# Patient Record
Sex: MALE | Race: WHITE | NOT HISPANIC OR LATINO | Employment: FULL TIME | URBAN - METROPOLITAN AREA
[De-identification: names, ages, dates, MRNs, and addresses within clinical notes are randomized per-mention and may not be internally consistent; named-entity substitution may affect disease eponyms.]

---

## 2022-09-13 ENCOUNTER — HOSPITAL ENCOUNTER (EMERGENCY)
Facility: HOSPITAL | Age: 37
Discharge: PSYCHIATRIC OR SUBSTANCE USE FACILITY | End: 2022-09-14
Attending: SURGERY
Payer: COMMERCIAL

## 2022-09-13 DIAGNOSIS — F29 PSYCHOSIS, UNSPECIFIED PSYCHOSIS TYPE: Primary | ICD-10-CM

## 2022-09-13 LAB
ALBUMIN SERPL BCP-MCNC: 4.7 G/DL (ref 3.5–5.2)
ALP SERPL-CCNC: 92 U/L (ref 55–135)
ALT SERPL W/O P-5'-P-CCNC: 22 U/L (ref 10–44)
AMPHET+METHAMPHET UR QL: NEGATIVE
ANION GAP SERPL CALC-SCNC: 10 MMOL/L (ref 8–16)
APAP SERPL-MCNC: <3 UG/ML (ref 10–20)
AST SERPL-CCNC: 17 U/L (ref 10–40)
BARBITURATES UR QL SCN>200 NG/ML: NEGATIVE
BASOPHILS # BLD AUTO: 0.03 K/UL (ref 0–0.2)
BASOPHILS NFR BLD: 0.4 % (ref 0–1.9)
BENZODIAZ UR QL SCN>200 NG/ML: NEGATIVE
BILIRUB SERPL-MCNC: 2.3 MG/DL (ref 0.1–1)
BILIRUB UR QL STRIP: ABNORMAL
BUN SERPL-MCNC: 15 MG/DL (ref 6–20)
BZE UR QL SCN: NEGATIVE
CALCIUM SERPL-MCNC: 9.6 MG/DL (ref 8.7–10.5)
CANNABINOIDS UR QL SCN: NEGATIVE
CHLORIDE SERPL-SCNC: 105 MMOL/L (ref 95–110)
CLARITY UR: CLEAR
CO2 SERPL-SCNC: 25 MMOL/L (ref 23–29)
COLOR UR: ABNORMAL
CREAT SERPL-MCNC: 1 MG/DL (ref 0.5–1.4)
CREAT UR-MCNC: 382.6 MG/DL (ref 23–375)
DIFFERENTIAL METHOD: ABNORMAL
EOSINOPHIL # BLD AUTO: 0.1 K/UL (ref 0–0.5)
EOSINOPHIL NFR BLD: 1.2 % (ref 0–8)
ERYTHROCYTE [DISTWIDTH] IN BLOOD BY AUTOMATED COUNT: 12.8 % (ref 11.5–14.5)
EST. GFR  (NO RACE VARIABLE): >60 ML/MIN/1.73 M^2
ETHANOL SERPL-MCNC: <10 MG/DL
GLUCOSE SERPL-MCNC: 106 MG/DL (ref 70–110)
GLUCOSE UR QL STRIP: NEGATIVE
HCT VFR BLD AUTO: 50.6 % (ref 40–54)
HGB BLD-MCNC: 17.7 G/DL (ref 14–18)
HGB UR QL STRIP: NEGATIVE
IMM GRANULOCYTES # BLD AUTO: 0.01 K/UL (ref 0–0.04)
IMM GRANULOCYTES NFR BLD AUTO: 0.1 % (ref 0–0.5)
KETONES UR QL STRIP: NEGATIVE
LEUKOCYTE ESTERASE UR QL STRIP: NEGATIVE
LYMPHOCYTES # BLD AUTO: 1.8 K/UL (ref 1–4.8)
LYMPHOCYTES NFR BLD: 23.6 % (ref 18–48)
MCH RBC QN AUTO: 33.4 PG (ref 27–31)
MCHC RBC AUTO-ENTMCNC: 35 G/DL (ref 32–36)
MCV RBC AUTO: 96 FL (ref 82–98)
METHADONE UR QL SCN>300 NG/ML: NEGATIVE
MONOCYTES # BLD AUTO: 0.7 K/UL (ref 0.3–1)
MONOCYTES NFR BLD: 8.5 % (ref 4–15)
NEUTROPHILS # BLD AUTO: 5 K/UL (ref 1.8–7.7)
NEUTROPHILS NFR BLD: 66.2 % (ref 38–73)
NITRITE UR QL STRIP: NEGATIVE
NRBC BLD-RTO: 0 /100 WBC
OPIATES UR QL SCN: NEGATIVE
PCP UR QL SCN>25 NG/ML: NEGATIVE
PH UR STRIP: 6 [PH] (ref 5–8)
PLATELET # BLD AUTO: 197 K/UL (ref 150–450)
PMV BLD AUTO: 9.8 FL (ref 9.2–12.9)
POTASSIUM SERPL-SCNC: 3.8 MMOL/L (ref 3.5–5.1)
PROT SERPL-MCNC: 8 G/DL (ref 6–8.4)
PROT UR QL STRIP: ABNORMAL
RBC # BLD AUTO: 5.3 M/UL (ref 4.6–6.2)
SALICYLATES SERPL-MCNC: <5 MG/DL (ref 15–30)
SARS-COV-2 RDRP RESP QL NAA+PROBE: NEGATIVE
SODIUM SERPL-SCNC: 140 MMOL/L (ref 136–145)
SP GR UR STRIP: >=1.03 (ref 1–1.03)
T4 FREE SERPL-MCNC: 1.06 NG/DL (ref 0.71–1.51)
TOXICOLOGY INFORMATION: ABNORMAL
TSH SERPL DL<=0.005 MIU/L-ACNC: 2.44 UIU/ML (ref 0.4–4)
URN SPEC COLLECT METH UR: ABNORMAL
UROBILINOGEN UR STRIP-ACNC: ABNORMAL EU/DL
WBC # BLD AUTO: 7.62 K/UL (ref 3.9–12.7)

## 2022-09-13 PROCEDURE — G0425 INPT/ED TELECONSULT30: HCPCS | Mod: GT,,, | Performed by: PSYCHIATRY & NEUROLOGY

## 2022-09-13 PROCEDURE — 99285 EMERGENCY DEPT VISIT HI MDM: CPT

## 2022-09-13 PROCEDURE — 82306 VITAMIN D 25 HYDROXY: CPT | Performed by: SURGERY

## 2022-09-13 PROCEDURE — 82077 ASSAY SPEC XCP UR&BREATH IA: CPT | Performed by: SURGERY

## 2022-09-13 PROCEDURE — 80143 DRUG ASSAY ACETAMINOPHEN: CPT | Performed by: SURGERY

## 2022-09-13 PROCEDURE — G0425 PR INPT TELEHEALTH CONSULT 30M: ICD-10-PCS | Mod: GT,,, | Performed by: PSYCHIATRY & NEUROLOGY

## 2022-09-13 PROCEDURE — 80307 DRUG TEST PRSMV CHEM ANLYZR: CPT | Performed by: SURGERY

## 2022-09-13 PROCEDURE — 36415 COLL VENOUS BLD VENIPUNCTURE: CPT | Performed by: SURGERY

## 2022-09-13 PROCEDURE — 81003 URINALYSIS AUTO W/O SCOPE: CPT | Mod: 59 | Performed by: SURGERY

## 2022-09-13 PROCEDURE — 80061 LIPID PANEL: CPT | Performed by: STUDENT IN AN ORGANIZED HEALTH CARE EDUCATION/TRAINING PROGRAM

## 2022-09-13 PROCEDURE — 80179 DRUG ASSAY SALICYLATE: CPT | Performed by: SURGERY

## 2022-09-13 PROCEDURE — U0002 COVID-19 LAB TEST NON-CDC: HCPCS | Performed by: SURGERY

## 2022-09-13 PROCEDURE — 84439 ASSAY OF FREE THYROXINE: CPT | Performed by: SURGERY

## 2022-09-13 PROCEDURE — 80053 COMPREHEN METABOLIC PANEL: CPT | Performed by: SURGERY

## 2022-09-13 PROCEDURE — 84443 ASSAY THYROID STIM HORMONE: CPT | Performed by: SURGERY

## 2022-09-13 PROCEDURE — 82607 VITAMIN B-12: CPT | Performed by: SURGERY

## 2022-09-13 PROCEDURE — 85025 COMPLETE CBC W/AUTO DIFF WBC: CPT | Performed by: SURGERY

## 2022-09-13 PROCEDURE — 82746 ASSAY OF FOLIC ACID SERUM: CPT | Performed by: SURGERY

## 2022-09-14 ENCOUNTER — HOSPITAL ENCOUNTER (INPATIENT)
Facility: HOSPITAL | Age: 37
LOS: 1 days | Discharge: HOME | DRG: 885 | End: 2022-09-15
Attending: STUDENT IN AN ORGANIZED HEALTH CARE EDUCATION/TRAINING PROGRAM | Admitting: STUDENT IN AN ORGANIZED HEALTH CARE EDUCATION/TRAINING PROGRAM
Payer: COMMERCIAL

## 2022-09-14 VITALS
HEART RATE: 65 BPM | DIASTOLIC BLOOD PRESSURE: 55 MMHG | WEIGHT: 157 LBS | RESPIRATION RATE: 16 BRPM | BODY MASS INDEX: 23.87 KG/M2 | OXYGEN SATURATION: 95 % | SYSTOLIC BLOOD PRESSURE: 98 MMHG | TEMPERATURE: 97 F

## 2022-09-14 DIAGNOSIS — F39 MOOD DISORDER: ICD-10-CM

## 2022-09-14 DIAGNOSIS — F29 PSYCHOSIS, UNSPECIFIED PSYCHOSIS TYPE: Primary | ICD-10-CM

## 2022-09-14 LAB
25(OH)D3+25(OH)D2 SERPL-MCNC: 34 NG/ML (ref 30–96)
CHOLEST SERPL-MCNC: 199 MG/DL (ref 120–199)
CHOLEST/HDLC SERPL: 5.4 {RATIO} (ref 2–5)
FOLATE SERPL-MCNC: 5 NG/ML (ref 4–24)
HDLC SERPL-MCNC: 37 MG/DL (ref 40–75)
HDLC SERPL: 18.6 % (ref 20–50)
LDLC SERPL CALC-MCNC: 134.2 MG/DL (ref 63–159)
NONHDLC SERPL-MCNC: 162 MG/DL
TRIGL SERPL-MCNC: 139 MG/DL (ref 30–150)
VIT B12 SERPL-MCNC: 313 PG/ML (ref 210–950)

## 2022-09-14 PROCEDURE — 11400000 HC PSYCH PRIVATE ROOM

## 2022-09-14 PROCEDURE — 99222 1ST HOSP IP/OBS MODERATE 55: CPT | Mod: ,,, | Performed by: NURSE PRACTITIONER

## 2022-09-14 PROCEDURE — 99223 1ST HOSP IP/OBS HIGH 75: CPT | Mod: ,,, | Performed by: STUDENT IN AN ORGANIZED HEALTH CARE EDUCATION/TRAINING PROGRAM

## 2022-09-14 PROCEDURE — 36415 COLL VENOUS BLD VENIPUNCTURE: CPT | Performed by: STUDENT IN AN ORGANIZED HEALTH CARE EDUCATION/TRAINING PROGRAM

## 2022-09-14 PROCEDURE — 99223 PR INITIAL HOSPITAL CARE,LEVL III: ICD-10-PCS | Mod: ,,, | Performed by: STUDENT IN AN ORGANIZED HEALTH CARE EDUCATION/TRAINING PROGRAM

## 2022-09-14 PROCEDURE — 99222 PR INITIAL HOSPITAL CARE,LEVL II: ICD-10-PCS | Mod: ,,, | Performed by: NURSE PRACTITIONER

## 2022-09-14 RX ORDER — FOLIC ACID 1 MG/1
1 TABLET ORAL DAILY
Status: DISCONTINUED | OUTPATIENT
Start: 2022-09-14 | End: 2022-09-15 | Stop reason: HOSPADM

## 2022-09-14 RX ORDER — OLANZAPINE 10 MG/1
10 TABLET ORAL EVERY 4 HOURS PRN
Status: DISCONTINUED | OUTPATIENT
Start: 2022-09-14 | End: 2022-09-15 | Stop reason: HOSPADM

## 2022-09-14 RX ORDER — IBUPROFEN 200 MG
1 TABLET ORAL DAILY PRN
Status: DISCONTINUED | OUTPATIENT
Start: 2022-09-14 | End: 2022-09-15 | Stop reason: HOSPADM

## 2022-09-14 RX ORDER — OLANZAPINE 10 MG/2ML
10 INJECTION, POWDER, FOR SOLUTION INTRAMUSCULAR EVERY 4 HOURS PRN
Status: DISCONTINUED | OUTPATIENT
Start: 2022-09-14 | End: 2022-09-15 | Stop reason: HOSPADM

## 2022-09-14 RX ORDER — HYDROXYZINE PAMOATE 50 MG/1
50 CAPSULE ORAL EVERY 6 HOURS PRN
Status: DISCONTINUED | OUTPATIENT
Start: 2022-09-14 | End: 2022-09-15 | Stop reason: HOSPADM

## 2022-09-14 RX ORDER — IBUPROFEN 200 MG
1 TABLET ORAL DAILY
Status: DISCONTINUED | OUTPATIENT
Start: 2022-09-14 | End: 2022-09-14

## 2022-09-14 NOTE — SUBJECTIVE & OBJECTIVE
Past Medical History:   Diagnosis Date    ADD (attention deficit disorder)     Special screening for malignant neoplasm of colon 09/06/2016       Past Surgical History:   Procedure Laterality Date    KNEE SURGERY Right 1995    Removal of a foreign object-stick       Review of patient's allergies indicates:  No Known Allergies    No current facility-administered medications on file prior to encounter.     Current Outpatient Medications on File Prior to Encounter   Medication Sig    dextroamphetamine-amphetamine (ADDERALL) 30 mg Tab Take 1 tablet by mouth 2 (two) times daily.     Family History    None       Tobacco Use    Smoking status: Some Days     Packs/day: 0.10     Types: Cigarettes     Start date: 9/1/2003    Smokeless tobacco: Never    Tobacco comments:     in process of trying to stop-down to 1 and 1/2 cigarettes a day   Substance and Sexual Activity    Alcohol use: No    Drug use: No    Sexual activity: Yes     Partners: Female     Comment:      Review of Systems   Constitutional:  Negative for chills and fever.   HENT:  Negative for congestion, postnasal drip and sore throat.    Eyes:  Negative for photophobia.   Respiratory:  Negative for chest tightness and shortness of breath.    Cardiovascular:  Negative for chest pain.   Gastrointestinal:  Negative for abdominal distention, abdominal pain, blood in stool and vomiting.   Genitourinary:  Negative for dysuria, flank pain and hematuria.   Musculoskeletal:  Negative for back pain.   Skin:  Negative for pallor.   Neurological:  Negative for dizziness, seizures, facial asymmetry, speech difficulty and numbness.   Hematological:  Does not bruise/bleed easily.   Psychiatric/Behavioral:  Positive for dysphoric mood and hallucinations. Negative for agitation and suicidal ideas. The patient is not nervous/anxious.    Objective:     Vital Signs (Most Recent):  Temp: 97.6 °F (36.4 °C) (09/14/22 0732)  Pulse: (!) 57 (09/14/22 0732)  Resp: 18 (09/14/22  0732)  BP: (!) 102/58 (09/14/22 0732)  SpO2: 97 % (09/14/22 0130)   Vital Signs (24h Range):  Temp:  [97 °F (36.1 °C)-97.6 °F (36.4 °C)] 97.6 °F (36.4 °C)  Pulse:  [57-90] 57  Resp:  [16-18] 18  SpO2:  [95 %-100 %] 97 %  BP: ()/(55-91) 102/58     Weight: 67 kg (147 lb 9.6 oz)  Body mass index is 21.8 kg/m².    Physical Exam  Vitals and nursing note reviewed.   Constitutional:       Appearance: Normal appearance. He is well-developed.   HENT:      Head: Normocephalic and atraumatic.   Neck:      Thyroid: No thyromegaly.   Cardiovascular:      Rate and Rhythm: Normal rate and regular rhythm.      Heart sounds: Normal heart sounds. No murmur heard.  Pulmonary:      Effort: Pulmonary effort is normal. No respiratory distress.      Breath sounds: Normal breath sounds. No wheezing or rales.   Abdominal:      General: Bowel sounds are normal. There is no distension.      Palpations: Abdomen is soft.      Tenderness: There is no abdominal tenderness.   Musculoskeletal:         General: No deformity. Normal range of motion.      Cervical back: Normal range of motion and neck supple.   Skin:     General: Skin is warm and dry.   Neurological:      Mental Status: He is alert and oriented to person, place, and time.      Comments: Neuro: Cranial nerves:  CN II Visual fields full to confrontation.   CN III, IV, VI Pupils are equal, round, and reactive to light.  CN III: no palsy  Nystagmus: none   Diplopia: none  Ophthalmoparesis: none  CN V Facial sensation intact.   CN VII Facial expression full, symmetric.   CN VIII normal.   CN IX normal.   CN X normal.   CN XI normal.   CN XII normal.         Significant Labs:     U/A  Trace protein 1+ bili and 4-6 urobilinogen   UDS  Results for orders placed or performed during the hospital encounter of 09/13/22   Drug screen panel, in-house   Result Value Ref Range    Benzodiazepines Negative Negative    Methadone metabolites Negative Negative    Cocaine (Metab.) Negative Negative     Opiate Scrn, Ur Negative Negative    Barbiturate Screen, Ur Negative Negative    Amphetamine Screen, Ur Negative Negative    THC Negative Negative    Phencyclidine Negative Negative    Creatinine, Urine 382.6 (H) 23.0 - 375.0 mg/dL    Toxicology Information SEE COMMENT      CBC  Results for orders placed or performed during the hospital encounter of 09/13/22   CBC Auto Differential   Result Value Ref Range    WBC 7.62 3.90 - 12.70 K/uL    RBC 5.30 4.60 - 6.20 M/uL    Hemoglobin 17.7 14.0 - 18.0 g/dL    Hematocrit 50.6 40.0 - 54.0 %    MCV 96 82 - 98 fL    MCH 33.4 (H) 27.0 - 31.0 pg    MCHC 35.0 32.0 - 36.0 g/dL    RDW 12.8 11.5 - 14.5 %    Platelets 197 150 - 450 K/uL    MPV 9.8 9.2 - 12.9 fL    Immature Granulocytes 0.1 0.0 - 0.5 %    Gran # (ANC) 5.0 1.8 - 7.7 K/uL    Immature Grans (Abs) 0.01 0.00 - 0.04 K/uL    Lymph # 1.8 1.0 - 4.8 K/uL    Mono # 0.7 0.3 - 1.0 K/uL    Eos # 0.1 0.0 - 0.5 K/uL    Baso # 0.03 0.00 - 0.20 K/uL    nRBC 0 0 /100 WBC    Gran % 66.2 38.0 - 73.0 %    Lymph % 23.6 18.0 - 48.0 %    Mono % 8.5 4.0 - 15.0 %    Eosinophil % 1.2 0.0 - 8.0 %    Basophil % 0.4 0.0 - 1.9 %    Differential Method Automated      CMP  Results for orders placed or performed during the hospital encounter of 09/13/22   Comprehensive Metabolic Panel   Result Value Ref Range    Sodium 140 136 - 145 mmol/L    Potassium 3.8 3.5 - 5.1 mmol/L    Chloride 105 95 - 110 mmol/L    CO2 25 23 - 29 mmol/L    Glucose 106 70 - 110 mg/dL    BUN 15 6 - 20 mg/dL    Creatinine 1.0 0.5 - 1.4 mg/dL    Calcium 9.6 8.7 - 10.5 mg/dL    Total Protein 8.0 6.0 - 8.4 g/dL    Albumin 4.7 3.5 - 5.2 g/dL    Total Bilirubin 2.3 (H) 0.1 - 1.0 mg/dL    Alkaline Phosphatase 92 55 - 135 U/L    AST 17 10 - 40 U/L    ALT 22 10 - 44 U/L    Anion Gap 10 8 - 16 mmol/L    eGFR >60 >60 mL/min/1.73 m^2     TSH  Results for orders placed or performed during the hospital encounter of 09/13/22   TSH   Result Value Ref Range    TSH 2.441 0.400 - 4.000 uIU/mL      ETOH  Results for orders placed or performed during the hospital encounter of 09/13/22   Ethanol   Result Value Ref Range    Alcohol, Serum <10 <10 mg/dL     Salicylate  Results for orders placed or performed during the hospital encounter of 09/13/22   Salicylate Level   Result Value Ref Range    Salicylate Lvl <5.0 (L) 15.0 - 30.0 mg/dL     Acetaminophen  Results for orders placed or performed during the hospital encounter of 09/13/22   Acetaminophen Level   Result Value Ref Range    Acetaminophen (Tylenol), Serum <3.0 (L) 10.0 - 20.0 ug/mL

## 2022-09-14 NOTE — CONSULTS
[facility] Hospital Medicine  Consult Note    Patient Name: Hal Calderon  MRN: [id number]  Admission Date: 9/14/2022  Hospital Length of Stay: 0 days  Attending Physician: Jonathon Huffman III, MD   Primary Care Provider: Nirav Moeller NP           Patient information was obtained from patient and ER records.     Inpatient consult to Family Medicine  Consult performed by: Marlin Gonzalez NP  Consult ordered by: Jonathon Huffman III, MD        Subjective:     Principal Problem: <principal problem not specified>    Chief Complaint: No chief complaint on file.       HPI: 36 yo male patient admitted to [facility] from ER for depression. He was placed in U and medicine was consulted for H>p. VSS/afebrile. Labs reviewed       Past Medical History:   Diagnosis Date    ADD (attention deficit disorder)     Special screening for malignant neoplasm of colon 09/06/2016       Past Surgical History:   Procedure Laterality Date    KNEE SURGERY Right 1995    Removal of a foreign object-stick       Review of patient's allergies indicates:  No Known Allergies    No current facility-administered medications on file prior to encounter.     Current Outpatient Medications on File Prior to Encounter   Medication Sig    dextroamphetamine-amphetamine (ADDERALL) 30 mg Tab Take 1 tablet by mouth 2 (two) times daily.     Family History    None       Tobacco Use    Smoking status: Some Days     Packs/day: 0.10     Types: Cigarettes     Start date: 9/1/2003    Smokeless tobacco: Never    Tobacco comments:     in process of trying to stop-down to 1 and 1/2 cigarettes a day   Substance and Sexual Activity    Alcohol use: No    Drug use: No    Sexual activity: Yes     Partners: Female     Comment:      Review of Systems   Constitutional:  Negative for chills and fever.   HENT:  Negative for congestion, postnasal drip and sore throat.    Eyes:  Negative for photophobia.   Respiratory:  Negative for chest tightness  and shortness of breath.    Cardiovascular:  Negative for chest pain.   Gastrointestinal:  Negative for abdominal distention, abdominal pain, blood in stool and vomiting.   Genitourinary:  Negative for dysuria, flank pain and hematuria.   Musculoskeletal:  Negative for back pain.   Skin:  Negative for pallor.   Neurological:  Negative for dizziness, seizures, facial asymmetry, speech difficulty and numbness.   Hematological:  Does not bruise/bleed easily.   Psychiatric/Behavioral:  Positive for dysphoric mood and hallucinations. Negative for agitation and suicidal ideas. The patient is not nervous/anxious.    Objective:     Vital Signs (Most Recent):  Temp: 97.6 °F (36.4 °C) (09/14/22 0732)  Pulse: (!) 57 (09/14/22 0732)  Resp: 18 (09/14/22 0732)  BP: (!) 102/58 (09/14/22 0732)  SpO2: 97 % (09/14/22 0130)   Vital Signs (24h Range):  Temp:  [97 °F (36.1 °C)-97.6 °F (36.4 °C)] 97.6 °F (36.4 °C)  Pulse:  [57-90] 57  Resp:  [16-18] 18  SpO2:  [95 %-100 %] 97 %  BP: ()/(55-91) 102/58     Weight: 67 kg (147 lb 9.6 oz)  Body mass index is 21.8 kg/m².    Physical Exam  Vitals and nursing note reviewed.   Constitutional:       Appearance: Normal appearance. He is well-developed.   HENT:      Head: Normocephalic and atraumatic.   Neck:      Thyroid: No thyromegaly.   Cardiovascular:      Rate and Rhythm: Normal rate and regular rhythm.      Heart sounds: Normal heart sounds. No murmur heard.  Pulmonary:      Effort: Pulmonary effort is normal. No respiratory distress.      Breath sounds: Normal breath sounds. No wheezing or rales.   Abdominal:      General: Bowel sounds are normal. There is no distension.      Palpations: Abdomen is soft.      Tenderness: There is no abdominal tenderness.   Musculoskeletal:         General: No deformity. Normal range of motion.      Cervical back: Normal range of motion and neck supple.   Skin:     General: Skin is warm and dry.   Neurological:      Mental Status: He is alert and  oriented to person, place, and time.      Comments: Neuro: Cranial nerves:  CN II Visual fields full to confrontation.   CN III, IV, VI Pupils are equal, round, and reactive to light.  CN III: no palsy  Nystagmus: none   Diplopia: none  Ophthalmoparesis: none  CN V Facial sensation intact.   CN VII Facial expression full, symmetric.   CN VIII normal.   CN IX normal.   CN X normal.   CN XI normal.   CN XII normal.         Significant Labs:     U/A  Trace protein 1+ bili and 4-6 urobilinogen   UDS  Results for orders placed or performed during the hospital encounter of 09/13/22   Drug screen panel, in-house   Result Value Ref Range    Benzodiazepines Negative Negative    Methadone metabolites Negative Negative    Cocaine (Metab.) Negative Negative    Opiate Scrn, Ur Negative Negative    Barbiturate Screen, Ur Negative Negative    Amphetamine Screen, Ur Negative Negative    THC Negative Negative    Phencyclidine Negative Negative    Creatinine, Urine 382.6 (H) 23.0 - 375.0 mg/dL    Toxicology Information SEE COMMENT      CBC  Results for orders placed or performed during the hospital encounter of 09/13/22   CBC Auto Differential   Result Value Ref Range    WBC 7.62 3.90 - 12.70 K/uL    RBC 5.30 4.60 - 6.20 M/uL    Hemoglobin 17.7 14.0 - 18.0 g/dL    Hematocrit 50.6 40.0 - 54.0 %    MCV 96 82 - 98 fL    MCH 33.4 (H) 27.0 - 31.0 pg    MCHC 35.0 32.0 - 36.0 g/dL    RDW 12.8 11.5 - 14.5 %    Platelets 197 150 - 450 K/uL    MPV 9.8 9.2 - 12.9 fL    Immature Granulocytes 0.1 0.0 - 0.5 %    Gran # (ANC) 5.0 1.8 - 7.7 K/uL    Immature Grans (Abs) 0.01 0.00 - 0.04 K/uL    Lymph # 1.8 1.0 - 4.8 K/uL    Mono # 0.7 0.3 - 1.0 K/uL    Eos # 0.1 0.0 - 0.5 K/uL    Baso # 0.03 0.00 - 0.20 K/uL    nRBC 0 0 /100 WBC    Gran % 66.2 38.0 - 73.0 %    Lymph % 23.6 18.0 - 48.0 %    Mono % 8.5 4.0 - 15.0 %    Eosinophil % 1.2 0.0 - 8.0 %    Basophil % 0.4 0.0 - 1.9 %    Differential Method Automated      CMP  Results for orders placed or  performed during the hospital encounter of 09/13/22   Comprehensive Metabolic Panel   Result Value Ref Range    Sodium 140 136 - 145 mmol/L    Potassium 3.8 3.5 - 5.1 mmol/L    Chloride 105 95 - 110 mmol/L    CO2 25 23 - 29 mmol/L    Glucose 106 70 - 110 mg/dL    BUN 15 6 - 20 mg/dL    Creatinine 1.0 0.5 - 1.4 mg/dL    Calcium 9.6 8.7 - 10.5 mg/dL    Total Protein 8.0 6.0 - 8.4 g/dL    Albumin 4.7 3.5 - 5.2 g/dL    Total Bilirubin 2.3 (H) 0.1 - 1.0 mg/dL    Alkaline Phosphatase 92 55 - 135 U/L    AST 17 10 - 40 U/L    ALT 22 10 - 44 U/L    Anion Gap 10 8 - 16 mmol/L    eGFR >60 >60 mL/min/1.73 m^2     TSH  Results for orders placed or performed during the hospital encounter of 09/13/22   TSH   Result Value Ref Range    TSH 2.441 0.400 - 4.000 uIU/mL     ETOH  Results for orders placed or performed during the hospital encounter of 09/13/22   Ethanol   Result Value Ref Range    Alcohol, Serum <10 <10 mg/dL     Salicylate  Results for orders placed or performed during the hospital encounter of 09/13/22   Salicylate Level   Result Value Ref Range    Salicylate Lvl <5.0 (L) 15.0 - 30.0 mg/dL     Acetaminophen  Results for orders placed or performed during the hospital encounter of 09/13/22   Acetaminophen Level   Result Value Ref Range    Acetaminophen (Tylenol), Serum <3.0 (L) 10.0 - 20.0 ug/mL             Assessment/Plan:     Psychosis  Further orders per psych        VTE Risk Mitigation (From admission, onward)    None              Thank you for your consult. I will sign off. Please contact us if you have any additional questions.    Marlin Gonzalez NP  Department of Hospital Medicine   [facility]

## 2022-09-14 NOTE — CONSULTS
"[facility]  Psychiatry  Telepsychiatry Consult Note    Please see previous notes:    Patient agreeable to consultation via telepsychiatry.    Tele-Consultation from Psychiatry started: 9/13/2022 at 10:50pm  The chief complaint leading to psychiatric consultation is: OPC  This consultation was requested by Dr Garcia, the Emergency Department attending physician.  The location of the consulting psychiatrist is  [place] .  The patient location is  [facility] EMERGENCY DEPARTMENT   The patient arrived at the ED at: [facility]    Also present with the patient at the time of the consultation: nobody    Patient Identification:   Hal Calderon is a 37 y.o. male.    Patient information was obtained from patient and past medical records.  Patient presented involuntarily to the Emergency Department     Consults  Teleconsult Time Documentation  Subjective:     History of Present Illness:  36yo male with hx of ADHD who was brought in on OPC for psychosis.     On interview, patient reports he is convinced his wife is trying to "put me away "because she is cheating on him. "She is trying to frame me and say I am crazy when I am actually not." Patient reports he has never seen this person but knows she is cheating. He reports he has suspected wife of cheating for one year. Reports he did not expect her to file an OPC.  Denied SI and HI.  Denied depression. Denied anxiety  Denied psychotic sx  Reports he sleeps well  Good appetite  Thus far in the ED patient has been calm and cooperative  This is the extent of patients complaints at this time  12 pt ros was negative aside from sx noted above.  I tried to call patients wife 2x at number on [phone] and in chart and she did not answer either number.         Psychiatric History:   Previous Psychiatric Hospitalizations: No   Previous Medication Trials: yes, adderall  Previous Suicide Attempts: no   History of Violence: no  History of Depression: no  History of Cece: " "no  History of Auditory/Visual Hallucination no  History of Delusions: no  Outpatient psychiatrist (current & past): No    Substance Abuse History:  Tobacco:No  Alcohol: No  Illicit Substances:No  Detox/Rehab: No    Legal History: Past charges/incarcerations: No     Family Psychiatric History:   denied      Social History:  Has multiple children 6,8,9, and 11. Denied access to firearms. [place] Patient reports he has family who live in the area.   6th grade education  No  hx    Psychiatric Mental Status Exam:  Arousal: alert  Sensorium/Orientation: oriented to grossly intact  Behavior/Cooperation: normal, cooperative   Speech: normal tone, normal rate, normal pitch, normal volume  Language: grossly intact  Mood: " fine "   Affect: appropriate  Thought Process: normal and logical  Thought Content:   Auditory hallucinations: NO  Visual hallucinations: NO  Paranoia: NO  Delusions:  possible delusion about his wife  Suicidal ideation: NO  Homicidal ideation: NO  Attention/Concentration:  intact  Memory:    Recent:  Intact   Remote: Intact     Fund of Knowledge: Aware of current events   Abstract reasoning: similarities were abstract  Insight: fair  Judgment: engaged with care      Past Medical History:   Past Medical History:   Diagnosis Date    ADD (attention deficit disorder)     Special screening for malignant neoplasm of colon 9/6/2016      Laboratory Data:   Labs Reviewed   COMPREHENSIVE METABOLIC PANEL - Abnormal; Notable for the following components:       Result Value    Total Bilirubin 2.3 (*)     All other components within normal limits   ACETAMINOPHEN LEVEL - Abnormal; Notable for the following components:    Acetaminophen (Tylenol), Serum <3.0 (*)     All other components within normal limits   SALICYLATE LEVEL - Abnormal; Notable for the following components:    Salicylate Lvl <5.0 (*)     All other components within normal limits   URINALYSIS, REFLEX TO URINE CULTURE - Abnormal; Notable " for the following components:    Specific Gravity, UA >=1.030 (*)     Protein, UA Trace (*)     Bilirubin (UA) 1+ (*)     Urobilinogen, UA 4.0-6.0 (*)     All other components within normal limits    Narrative:     Specimen Source->Urine   DRUG SCREEN PANEL, URINE EMERGENCY - Abnormal; Notable for the following components:    Creatinine, Urine 382.6 (*)     All other components within normal limits    Narrative:     Specimen Source->Urine   CBC W/ AUTO DIFFERENTIAL - Abnormal; Notable for the following components:    MCH 33.4 (*)     All other components within normal limits   SARS-COV-2 RNA AMPLIFICATION, QUAL   TSH   ALCOHOL,MEDICAL (ETHANOL)   T4, FREE   VITAMIN B12   FOLATE   VITAMIN D       Allergies:   Review of patient's allergies indicates:  No Known Allergies    Medications in ER: Medications - No data to display    Medications at home: reviewed with patient and in MAR    No new subjective & objective note has been filed under this hospital service since the last note was generated.      Assessment - Diagnosis - Goals:     Diagnosis/Impression:   Patient may have underlying psychotic illness. His description of his wife cheating on him without concrete evidence raises suspicion for delusion.    Rec:   At this time without interviewing collateral, I cannot say whether patient is psychotic or not however there are concerning features to his presentation. I recommend proceeding with OPC and the inpatient psychiatric team can  monitor patient further while getting in touch with collateral. This will allow for further diagnostic clarification and  risk stratification.     Time with patient, coordinating care: 30 min      More than 50% of the time was spent counseling/coordinating care    Consulting clinician was informed of the encounter and consult note.    Consultation ended: 9/13/2022 at 11:35pm    Patrick Kirk MD  Psychiatry  [facility]
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
## 2022-09-14 NOTE — HPI
36 yo male patient admitted to U from ER for depression. He was placed in U and medicine was consulted for H>p. VSS/afebrile. Labs reviewed

## 2022-09-14 NOTE — ED PROVIDER NOTES
Encounter Date: 9/13/2022       History     Chief Complaint   Patient presents with    Psychiatric Evaluation     Patient to ER CC with a OPC stating that patient is delusional, patient is A&O times 4, he is very calm and states he is fine his wife is cheating on him and made up a lot. He denies SI, denies HI, states he is not hearing voices     37-year-old male presents with possible psychosis in the emergency room today  Patient was placed under OPC by his wife on ER interview this afternoon here  Wife states that the patient is delusional in the OPC with possible illegal drug use  Patient thinks that the wife is cheating on him, that is why she filed the OPC  Patient denies any suicidal and homicidal ideation, answers all questions tonight    Review of patient's allergies indicates:  No Known Allergies    Past Medical History:   Diagnosis Date    ADD (attention deficit disorder)     Special screening for malignant neoplasm of colon 9/6/2016     Past Surgical History:   Procedure Laterality Date    KNEE SURGERY Right 1995    Removal of a foreign object-stick     No family history on file.  Social History     Tobacco Use    Smoking status: Some Days     Packs/day: 0.10     Types: Cigarettes     Start date: 9/1/2003    Smokeless tobacco: Never    Tobacco comments:     in process of trying to stop-down to 1 and 1/2 cigarettes a day   Substance Use Topics    Alcohol use: No    Drug use: No     Review of Systems   Constitutional:  Negative for activity change, appetite change, fatigue, fever and unexpected weight change.   HENT:  Negative for congestion, ear pain, mouth sores, nosebleeds, rhinorrhea, sinus pressure, sneezing and sore throat.    Eyes:  Negative for pain, discharge, redness and itching.   Respiratory:  Negative for apnea, cough, chest tightness and shortness of breath.    Cardiovascular:  Negative for chest pain, palpitations and leg swelling.   Gastrointestinal:  Negative for abdominal distention,  abdominal pain, anal bleeding, constipation, diarrhea, nausea and vomiting.   Endocrine: Negative.    Genitourinary:  Negative for dysuria, enuresis, flank pain and frequency.   Musculoskeletal:  Negative for arthralgias, back pain, neck pain and neck stiffness.   Skin:  Negative for color change and wound.   Allergic/Immunologic: Negative.    Neurological:  Negative for dizziness, tremors, syncope, facial asymmetry, speech difficulty, weakness, light-headedness, numbness and headaches.   Hematological:  Negative for adenopathy. Does not bruise/bleed easily.   Psychiatric/Behavioral:  Positive for hallucinations. Negative for agitation, behavioral problems, self-injury and suicidal ideas. The patient is not nervous/anxious.      Physical Exam     Initial Vitals [09/13/22 1747]   BP Pulse Resp Temp SpO2   (!) 125/91 90 18 97 °F (36.1 °C) 100 %      MAP       --         Physical Exam    Nursing note and vitals reviewed.  Constitutional: Vital signs are normal. He appears well-developed and well-nourished. He is cooperative.   HENT:   Head: Normocephalic and atraumatic.   Right Ear: Hearing, tympanic membrane, external ear and ear canal normal.   Left Ear: Hearing, tympanic membrane, external ear and ear canal normal.   Nose: Nose normal.   Mouth/Throat: Uvula is midline, oropharynx is clear and moist and mucous membranes are normal.   Eyes: Conjunctivae, EOM and lids are normal. Pupils are equal, round, and reactive to light.   Neck: Neck supple. No JVD present.   Normal range of motion.   Full passive range of motion without pain.     Cardiovascular:  Normal rate, regular rhythm, S1 normal, S2 normal, normal heart sounds, intact distal pulses and normal pulses.           Pulmonary/Chest: Effort normal and breath sounds normal.   Abdominal: Abdomen is soft and flat. Bowel sounds are normal.   Musculoskeletal:         General: Normal range of motion.      Cervical back: Full passive range of motion without pain, normal  range of motion and neck supple.     Neurological: He is alert and oriented to person, place, and time. He has normal strength.   Skin: Skin is warm, dry and intact. Capillary refill takes less than 2 seconds.       ED Course   Procedures  Labs Reviewed   COMPREHENSIVE METABOLIC PANEL - Abnormal; Notable for the following components:       Result Value    Total Bilirubin 2.3 (*)     All other components within normal limits   ACETAMINOPHEN LEVEL - Abnormal; Notable for the following components:    Acetaminophen (Tylenol), Serum <3.0 (*)     All other components within normal limits   SALICYLATE LEVEL - Abnormal; Notable for the following components:    Salicylate Lvl <5.0 (*)     All other components within normal limits   URINALYSIS, REFLEX TO URINE CULTURE - Abnormal; Notable for the following components:    Specific Gravity, UA >=1.030 (*)     Protein, UA Trace (*)     Bilirubin (UA) 1+ (*)     Urobilinogen, UA 4.0-6.0 (*)     All other components within normal limits    Narrative:     Specimen Source->Urine   DRUG SCREEN PANEL, URINE EMERGENCY - Abnormal; Notable for the following components:    Creatinine, Urine 382.6 (*)     All other components within normal limits    Narrative:     Specimen Source->Urine   CBC W/ AUTO DIFFERENTIAL - Abnormal; Notable for the following components:    MCH 33.4 (*)     All other components within normal limits   SARS-COV-2 RNA AMPLIFICATION, QUAL   TSH   ALCOHOL,MEDICAL (ETHANOL)   T4, FREE   VITAMIN B12   FOLATE   VITAMIN D          Imaging Results    None          Medications - No data to display  Medical Decision Making:   Initial Assessment:   Patient with OPC by his wife stating that he is psychotic  She is worried about illegal drug use and delusional behavior   Patient refutes all the accusations in the OPC by his wife    Differential Diagnosis:   Domestic issue, suicidal ideation, homicidal ideation, psychosis, drug use    Clinical Tests:   Lab Tests: Reviewed and  Ordered    ED Management:  Patient underwent telemedicine psychiatry evaluation today in the emergency room  Patient refutes a all the claims in the OPC, wife did not answer the phone for psychiatry  This is been a very hard case, it is very confusing based on the OPC this afternoon  Telemedicine psychiatrist recommending pec for further evaluation and research                        Clinical Impression:   Final diagnoses:  [F29] Psychosis, unspecified psychosis type (Primary)        ED Disposition Condition    Transfer to Psych Facility Stable               Alek Garcia MD  09/13/22 2878

## 2022-09-14 NOTE — PLAN OF CARE
Patient calm, cooperative, and polite; unkempt.  Denies intentions to harm self and/or others at this time. Denies auditory and/or visual hallucinations.  Affect and emotion congruent with situation. Thoughts are linear and logical.  Interacts appropriately with peers and staff; out in day room.  Accepts meals and refused nicotine patch and vitamins this am. Patient reports that he is a smoker and denies need for patch this morning; instructed to ask for patch if patient begins with agitation/irritability.  Discussed medication and plan of care; patient voiced understanding.    Patient expresses frustration in events surrounding admit and difficulty of discharge.

## 2022-09-14 NOTE — NURSING
Patient arrived to [facility] from [facility], via wheelchair, scanned, no contraband found, ambulated to assessment room without difficulty, PEC with patient.

## 2022-09-14 NOTE — PLAN OF CARE
" Discussed healthy coping skills and techniques. Discussed triggers to substance abuse and motivations  to stop use.  Patient reports "I am wanting to go to substance abuse rehab". Patient denies SI/HI no self harming behavior displayed. Patient contracted safety with staff and unit. Educated, reviewed  and discussed plan of care and medication regiment with this patient. Patient voiced understanding of all teachings.   "

## 2022-09-14 NOTE — H&P
"PSYCHIATRY INPATIENT ADMISSION NOTE - H & P      9/14/2022 11:08 AM   aHl Calderon   1985   [id number]         DATE OF ADMISSION: 9/14/2022  1:28 AM    SITE: [facility]    CURRENT LEGAL STATUS: PEC and/or CEC      HISTORY    CHIEF COMPLAINT   Hal Calderon is a 37 y.o. male with a past psychiatric history of  ADHD  currently admitted to the inpatient unit with the following chief complaint: delusions      HPI   The patient was seen and examined. The chart was reviewed.    The patient presented to the ER on 9/14/2022 .    The patient was medically cleared and admitted to the U.    Per ED MD:    Patient to ER CC with a OPC stating that patient is delusional, patient is A&O times 4, he is very calm and states he is fine his wife is cheating on him and made up a lot. He denies SI, denies HI, states he is not hearing voices      37-year-old male presents with possible psychosis in the emergency room today  Patient was placed under OPC by his wife on ER interview this afternoon here  Wife states that the patient is delusional in the OPC with possible illegal drug use  Patient thinks that the wife is cheating on him, that is why she filed the OPC  Patient denies any suicidal and homicidal ideation, answers all questions tonight    Per psychiatric consult MD in ED:  38yo male with hx of ADHD who was brought in on OPC for psychosis.      On interview, patient reports he is convinced his wife is trying to "put me away "because she is cheating on him. "She is trying to frame me and say I am crazy when I am actually not." Patient reports he has never seen this person but knows she is cheating. He reports he has suspected wife of cheating for one year. Reports he did not expect her to file an OPC.  Denied SI and HI.  Denied depression. Denied anxiety  Denied psychotic sx  Reports he sleeps well  Good appetite  Thus far in the ED patient has been calm and cooperative  This is the extent of patients complaints at " "this time  12 pt ros was negative aside from sx noted above.  I tried to call patients wife 2x at number on [phone] and in chart and she did not answer either number.     Patient may have underlying psychotic illness. His description of his wife cheating on him without concrete evidence raises suspicion for delusion.      Psychiatric interview on [facility]:  "My wife is saying I'm crazy so I can stay locked up in here so she can have sex with her boyfriend... this is ridiculous, I shouldn't be here... she's trying to frame me. 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 Patient was becoming more and more agitated during interview therefore interview was limited. He denies all psychiatric symptoms as below however there is significant c/f minimization.         Symptoms of Depression: diminished mood - No, loss of interest/anhedonia - No;      Changes in Sleep: trouble with initiation- No, maintenance, - No early morning awakening with inability to return to sleep - No, hypersomnolence - No    Suicidal- active/passive ideations - No, organized plans, future intentions - No    Homicidal ideations: active/passive ideations - No, organized plans, future intentions - No    Symptoms of psychosis: hallucinations - No, delusions -  denies however likely present , disorganized speech - No, disorganized behavior or abnormal motor behavior - No, or negative symptoms (diminshed emotional expression, avolition, anhedonia, alogia, asociality) - No, active phase symptoms >1 month - No, continuous signs of illness > 6 months - No, since onset of illness decreased level of functioning present - No    Pt denies Symptoms of brady or hypomania: elevated, expansive, or irritable mood with increased energy or " "activity - No; > 4 days - No,  >7 days - No; with inflated self-esteem or grandiosity - No, decreased need for sleep - No, increased rate of speech - No, FOI or racing thoughts - No, distractibility - No, increased goal directed activity or PMA - No, risky/disinhibited behavior - No    +observed irritability, increased rate of speech, insomnia, increased energy levels, reported disinhibition    Symptoms of JOHNP AUL: excessive anxiety/worry/fear, more days than not, about numerous issues - No, ongoing for >6 months - No, difficult to control - No, with restlessness - No, fatigue - No, poor concentration - No, irritability - No, muscle tension - No, sleep disturbance - No; causes functionally impairing distress - No    Symptoms of Panic Disorder: recurrent panic attacks (palpitations/heart racing, sweating, shakiness, dyspnea, choking, chest pain/discomfort, Gi symptoms, dizzy/lightheadedness, hot/col flashes, paresthesias, derealization, fear of losing control or fear of dying or fear of "going crazy") - No, precipitated - No, un-precipitated - No, source of worry and/or behavioral changes secondary for 1 month or longer- No, agoraphobia - No    Symptoms of PTSD: h/o trauma exposure - No; re-experiencing/intrusive symptoms - No, avoidant behavior - No, 2 or more negative alterations in cognition or mood - No, 2 or more hyperarousal symptoms - No; with dissociative symptoms - No, ongoing for 1 or more  months - No    Symptoms of OCD: obsessions (recurrent thoughts/urges/images; intrusive and/or unwanted; uses other thoughts/actions to suppress) - No; compulsions (repetitive behaviors used to lower distress/anxiety/obsessions) - No, time-consuming (over 1 hour per day) or cause significant distress/impairment - - No    Symptoms of Anorexia: restriction of caloric intake leading to significantly low body weight - No, intense fear of gaining weight or persistent behavior that interferes with weight gain even thought at a " significantly low weight - No, disturbance in the way in which one's body weight or shape is experienced, undue influence of body weight or shape on self evaluation, or persistent lack of recognition of the seriousness of the current low body weight - No    Symptoms of Bulimia: recurrent episodes of binge eating (definitely larger amount  than what others would eat and lack of a sense of control over eating during episode) - No, recurrent inappropriate compensatory behaviors in order to prevent weight gain (fasting, medications, exercise, vomiting) - No, binges and compensatory behaviors both occur on average at least once a week for 3 months - No, self evaluations is unduly influenced by body shape/weight- - No        PAST PSYCHIATRIC HISTORY  Previous Psychiatric Hospitalizations: No  Previous SI/HI: No,  Previous Suicide Attempts: No,   Previous Medication Trials: No,  Psychiatric Care (current & past): No,  History of Psychotherapy: No,  History of Violence: No,  History of sexual/physical abuse: No,    PAST MEDICAL & SURGICAL HISTORY   Past Medical History:   Diagnosis Date    ADD (attention deficit disorder)     Special screening for malignant neoplasm of colon 09/06/2016     Past Surgical History:   Procedure Laterality Date    KNEE SURGERY Right 1995    Removal of a foreign object-stick       Home Meds:   Prior to Admission medications    Medication Sig Start Date End Date Taking? Authorizing Provider   dextroamphetamine-amphetamine (ADDERALL) 30 mg Tab Take 1 tablet by mouth 2 (two) times daily.    Historical Provider           Scheduled Meds:    folic acid  1 mg Oral Daily    multivitamin  1 tablet Oral Daily      PRN Meds: hydrOXYzine pamoate, nicotine, OLANZapine **AND** OLANZapine   Psychotherapeutics (From admission, onward)      Start     Stop Route Frequency Ordered    09/14/22 0202  OLANZapine tablet 10 mg  (Olanzapine)        See Hyperspace for full Linked Orders Report.    -- Oral Every 4 hours  "PRN 09/14/22 0137    09/14/22 0202  OLANZapine injection 10 mg  (Olanzapine)        See Darwin for full Linked Orders Report.    -- IM Every 4 hours PRN 09/14/22 0137            ALLERGIES   Review of patient's allergies indicates:  No Known Allergies    NEUROLOGIC HISTORY  Seizures: No  Head trauma: No    SOCIAL HISTORY:  Developmental/Childhood:Achieved all developmental milestones timely  Education: 6th  Employment Status/Finances:Employed as   Relationship Status/Sexual Orientation:   Children:  6  Housing Status: Home    history:  NO   Access to Firearms: NO ;  Locked up? n/a  Confucianist:Spiritual without formal affiliation  Recreational activities: "hunt and fish"    SUBSTANCE ABUSE HISTORY   Recreational Drugs:  denies    Use of Alcohol: denied  Rehab History:no   Tobacco Use:yes    LEGAL HISTORY:   Past charges/incarcerations: NO  Pending charges:NO    FAMILY PSYCHIATRIC HISTORY   Denies      ROS  Review of Systems   Constitutional:  Negative for chills and fever.   HENT:  Negative for hearing loss.    Eyes:  Negative for blurred vision and double vision.   Respiratory:  Negative for shortness of breath.    Cardiovascular:  Negative for chest pain and palpitations.   Gastrointestinal:  Negative for constipation, diarrhea, nausea and vomiting.   Genitourinary:  Negative for dysuria and urgency.   Musculoskeletal:  Negative for back pain.   Skin:  Negative for rash.   Neurological:  Negative for dizziness and headaches.   Endo/Heme/Allergies:  Negative for environmental allergies.       EXAMINATION    PHYSICAL EXAM  Reviewed note/exam by Dr. Alek Garcia MD  09/13/22 2340    VITALS   Vitals:    09/14/22 0732   BP: (!) 102/58   Pulse: (!) 57   Resp: 18   Temp: 97.6 °F (36.4 °C)        Body mass index is 21.8 kg/m².        PAIN  0/10  Subjective report of pain matches objective signs and symptoms: Yes    LABORATORY DATA   Recent Results (from the past 72 hour(s))   COVID-19 Rapid " Screening    Collection Time: 09/13/22  5:51 PM   Result Value Ref Range    SARS-CoV-2 RNA, Amplification, Qual Negative Negative   Comprehensive Metabolic Panel    Collection Time: 09/13/22  6:02 PM   Result Value Ref Range    Sodium 140 136 - 145 mmol/L    Potassium 3.8 3.5 - 5.1 mmol/L    Chloride 105 95 - 110 mmol/L    CO2 25 23 - 29 mmol/L    Glucose 106 70 - 110 mg/dL    BUN 15 6 - 20 mg/dL    Creatinine 1.0 0.5 - 1.4 mg/dL    Calcium 9.6 8.7 - 10.5 mg/dL    Total Protein 8.0 6.0 - 8.4 g/dL    Albumin 4.7 3.5 - 5.2 g/dL    Total Bilirubin 2.3 (H) 0.1 - 1.0 mg/dL    Alkaline Phosphatase 92 55 - 135 U/L    AST 17 10 - 40 U/L    ALT 22 10 - 44 U/L    Anion Gap 10 8 - 16 mmol/L    eGFR >60 >60 mL/min/1.73 m^2   Acetaminophen Level    Collection Time: 09/13/22  6:02 PM   Result Value Ref Range    Acetaminophen (Tylenol), Serum <3.0 (L) 10.0 - 20.0 ug/mL   Salicylate Level    Collection Time: 09/13/22  6:02 PM   Result Value Ref Range    Salicylate Lvl <5.0 (L) 15.0 - 30.0 mg/dL   CBC Auto Differential    Collection Time: 09/13/22  6:02 PM   Result Value Ref Range    WBC 7.62 3.90 - 12.70 K/uL    RBC 5.30 4.60 - 6.20 M/uL    Hemoglobin 17.7 14.0 - 18.0 g/dL    Hematocrit 50.6 40.0 - 54.0 %    MCV 96 82 - 98 fL    MCH 33.4 (H) 27.0 - 31.0 pg    MCHC 35.0 32.0 - 36.0 g/dL    RDW 12.8 11.5 - 14.5 %    Platelets 197 150 - 450 K/uL    MPV 9.8 9.2 - 12.9 fL    Immature Granulocytes 0.1 0.0 - 0.5 %    Gran # (ANC) 5.0 1.8 - 7.7 K/uL    Immature Grans (Abs) 0.01 0.00 - 0.04 K/uL    Lymph # 1.8 1.0 - 4.8 K/uL    Mono # 0.7 0.3 - 1.0 K/uL    Eos # 0.1 0.0 - 0.5 K/uL    Baso # 0.03 0.00 - 0.20 K/uL    nRBC 0 0 /100 WBC    Gran % 66.2 38.0 - 73.0 %    Lymph % 23.6 18.0 - 48.0 %    Mono % 8.5 4.0 - 15.0 %    Eosinophil % 1.2 0.0 - 8.0 %    Basophil % 0.4 0.0 - 1.9 %    Differential Method Automated    TSH    Collection Time: 09/13/22  6:02 PM   Result Value Ref Range    TSH 2.441 0.400 - 4.000 uIU/mL   Vitamin B12    Collection  Time: 09/13/22  6:02 PM   Result Value Ref Range    Vitamin B-12 313 210 - 950 pg/mL   Folate    Collection Time: 09/13/22  6:02 PM   Result Value Ref Range    Folate 5.0 4.0 - 24.0 ng/mL   Ethanol    Collection Time: 09/13/22  6:02 PM   Result Value Ref Range    Alcohol, Serum <10 <10 mg/dL   Vitamin D    Collection Time: 09/13/22  6:02 PM   Result Value Ref Range    Vit D, 25-Hydroxy 34 30 - 96 ng/mL   T4, Free    Collection Time: 09/13/22  6:02 PM   Result Value Ref Range    Free T4 1.06 0.71 - 1.51 ng/dL   Lipid panel    Collection Time: 09/13/22  6:02 PM   Result Value Ref Range    Cholesterol 199 120 - 199 mg/dL    Triglycerides 139 30 - 150 mg/dL    HDL 37 (L) 40 - 75 mg/dL    LDL Cholesterol 134.2 63.0 - 159.0 mg/dL    HDL/Cholesterol Ratio 18.6 (L) 20.0 - 50.0 %    Total Cholesterol/HDL Ratio 5.4 (H) 2.0 - 5.0    Non-HDL Cholesterol 162 mg/dL   Urinalysis, Reflex to Urine Culture Urine, Clean Catch    Collection Time: 09/13/22  6:20 PM    Specimen: Urine   Result Value Ref Range    Specimen UA Urine, Clean Catch     Color, UA Jagruti Yellow, Straw, Jagruti    Appearance, UA Clear Clear    pH, UA 6.0 5.0 - 8.0    Specific Gravity, UA >=1.030 (A) 1.005 - 1.030    Protein, UA Trace (A) Negative    Glucose, UA Negative Negative    Ketones, UA Negative Negative    Bilirubin (UA) 1+ (A) Negative    Occult Blood UA Negative Negative    Nitrite, UA Negative Negative    Urobilinogen, UA 4.0-6.0 (A) <2.0 EU/dL    Leukocytes, UA Negative Negative   Drug screen panel, in-house    Collection Time: 09/13/22  6:20 PM   Result Value Ref Range    Benzodiazepines Negative Negative    Methadone metabolites Negative Negative    Cocaine (Metab.) Negative Negative    Opiate Scrn, Ur Negative Negative    Barbiturate Screen, Ur Negative Negative    Amphetamine Screen, Ur Negative Negative    THC Negative Negative    Phencyclidine Negative Negative    Creatinine, Urine 382.6 (H) 23.0 - 375.0 mg/dL    Toxicology Information SEE COMMENT        No results found for: PHENYTOIN, PHENOBARB, VALPROATE, CBMZ        CONSTITUTIONAL  General Appearance: unremarkable, age appropriate    MUSCULOSKELETAL  Muscle Strength and Tone:no tremor, no tic  Abnormal Involuntary Movements: No  Gait and Station: non-ataxic    PSYCHIATRIC   Level of Consciousness: awake and alert   Orientation: person, place, and situation  Grooming: Casually dressed and Well groomed  Psychomotor Behavior: reluctant to participate, psychomotor agitation, restless and fidgety   Speech: loud, rapid  Language: grossly intact  Mood: fine  Affect: Intense  Thought Process: linear  Associations: intact   Thought Content: +delusions, denies SI, and denies HI  Perceptions: denies AH and denies  VH  Memory: Remote intact and Recent intact  Attention:Attends to interview without distraction  Fund of Knowledge: Aware of current events and Vocabulary appropriate   Estimate if Intelligence:  Average based on work/education history, vocabulary and mental status exam  Insight: poor awareness of illness  Judgment: limited      PSYCHOSOCIAL    PSYCHOSOCIAL STRESSORS   marital    FUNCTIONING RELATIONSHIPS   strained with spouse or significant others    STRENGTHS AND LIABILITIES   Strength: Patient is expressive/articulate., Strength: Patient is physically healthy., Liability: Patient is defensive., Liability: Patient is hostile., Liability: Patient is impulsive.    Is the patient aware of the biomedical complications associated with substance abuse and mental illness? yes    Does the patient have an Advance Directive for Mental Health treatment? no  (If yes, inform patient to bring copy.)        MEDICAL DECISION MAKING      ASSESSMENT       Bipolar disorder, MRE, manic, severe with psychotic features  Psychosocial stressors  Nicotine dependence  H/o ADHD    Elevated T. Bili      PROBLEM LIST AND MANAGEMENT PLANS      Bipolar disorder, MRE, manic, severe with psychotic features  - pt refusing psychotropic  medications at this time  - follow up with outpatient mental health provider after discharge  - pt counseled  - monitor    Psychosocial stressors  - pt counseled  - SW consulted for assistance with additional resources       Nicotine dependence  - start nicotine patch 14 mg PO qd PRN      H/o ADHD  - receives adderall regularly from PCP per , may be contributing to SIMD or SIPD  - pt counseled      Elevated T. Curtisi  - FM consult        PRESCRIPTION DRUG MANAGEMENT  Compliance: unknown  Side Effects: unknown  Regimen Adjustments: see above    Discussed diagnosis, risks and benefits of proposed treatment vs alternative treatments vs no treatment, potential side effects of these treatments and the inherent unpredictability of treatment. The patient expresses understanding of the above and displays the capacity to agree with this treatment given said understanding. Patient also agrees that, currently, the benefits outweigh the risks and would like to pursue/continue treatment at this time.    Any medications being used off-label were discussed with the patient inclusive of the evidence base for the use of the medications and consent was obtained for the off-label use of the medication.         DIAGNOSTIC TESTING  Labs reviewed with patient; follow up pending labs    Disposition:  -Will attempt to obtain outside psychiatric records if available  -SW to assist with aftercare planning and collateral  -Once stable discharge home with outpatient follow up care and/or rehab  -Continue inpatient treatment under a PEC and/or CEC for danger to self/ danger to others/grave disability as evident by gravely disabled        Jonathon Huffman III, MD  Psychiatry

## 2022-09-14 NOTE — NURSING
New admit. Pt sleeping at this time, slept 2.5 hrs with no awakenings. NAD. Resp even and unlabored.Pathways clear,bed in low position. Q 15 min safety check ongoing.All precautions maintained.

## 2022-09-15 VITALS
RESPIRATION RATE: 16 BRPM | HEART RATE: 56 BPM | DIASTOLIC BLOOD PRESSURE: 59 MMHG | OXYGEN SATURATION: 97 % | HEIGHT: 69 IN | BODY MASS INDEX: 21.86 KG/M2 | SYSTOLIC BLOOD PRESSURE: 114 MMHG | TEMPERATURE: 98 F | WEIGHT: 147.63 LBS

## 2022-09-15 PROCEDURE — 99239 PR HOSPITAL DISCHARGE DAY,>30 MIN: ICD-10-PCS | Mod: ,,, | Performed by: STUDENT IN AN ORGANIZED HEALTH CARE EDUCATION/TRAINING PROGRAM

## 2022-09-15 PROCEDURE — 90833 PR PSYCHOTHERAPY W/PATIENT W/E&M, 30 MIN (ADD ON): ICD-10-PCS | Mod: ,,, | Performed by: STUDENT IN AN ORGANIZED HEALTH CARE EDUCATION/TRAINING PROGRAM

## 2022-09-15 PROCEDURE — 90833 PSYTX W PT W E/M 30 MIN: CPT | Mod: ,,, | Performed by: STUDENT IN AN ORGANIZED HEALTH CARE EDUCATION/TRAINING PROGRAM

## 2022-09-15 PROCEDURE — 99239 HOSP IP/OBS DSCHRG MGMT >30: CPT | Mod: ,,, | Performed by: STUDENT IN AN ORGANIZED HEALTH CARE EDUCATION/TRAINING PROGRAM

## 2022-09-15 NOTE — NURSING
"Pt states that he feels "good" today, spending majority in activity room. Interacting appropraitely with staff and peers. Denies SI/HI. Denies hallucinations. Appetite adequate. Denies sleep disturbances. NADN. Remains calm and cooperative. Safety precautions remain in in place.  "

## 2022-09-15 NOTE — PLAN OF CARE
Patient calm, cooperative, polite, and pleasant; verbalizes slight frustration regarding continued admit to [facility] after father's collateral corroborated patient's story.  Denies intentions to harm self and/or others at this time. Denies auditory and/or visual hallucinations.  Affect .  Thoughts are linear and logical. Interacts appropriately with peers and staff. Accepts meals and medications.  Discussed medication and plan of care as well as healthy coping skills and techniques; patient voiced understanding.    All belongings accounted for and will be physically given to patient upon discharge.  Patient denies intentions to harm self and/or others at this time.  No acute distress noted. Will discharge to home via [employer] transportation service; will call patient with date and time of appointment at [facility].  Patient educated on discharge plan, aftercare appointments, and medications.

## 2022-09-15 NOTE — PLAN OF CARE
Pt sleeping at this time, slept 7 hrs,resp even and unlabored.Pathways clear,bed in low position,Q15 min safety check ongoing.All precautions maintained.

## 2022-09-15 NOTE — PROGRESS NOTES
09/15/22 1045   [facility] Group Therapy   Group Name Therapeutic Recreation   Participation Level None   Patient was with the counselor and did not attend group.

## 2022-09-15 NOTE — CARE UPDATE
[facility]  Encounter Date: 2022  1:28 AM    Discharge Date No discharge date for patient encounter.   Hospital Account: [id number]    MRN: [id number]   Guarantor: RAJI SINGER   Contact Serial #: [id number]         ENCOUNTER             Patient Class: IP Psych   Unit: [facility] BEHAVIOR[facility]*   Hospital Service: Psychiatry   Bed: 213   Admitting Provider: Jonathon Huffman Iii, Md   Referring Physician: Self, Aaareferral   Attending Provider: Jonathon Huffman Iii, Md   Adm Diagnosis: Mood disorder [F39]      PATIENT                  Name: RAJI SINGER : 1985 (37 yrs)   Address: [address] Sex: Male   City: [place] [address]       Primary Care Provider: Nirav Moeller NP         Primary Phone: [phone]   EMERGENCY CONTACT   Contact Name Legal Guardian? Relationship to Patient Home Phone Work Phone Mobile Phone   1. Baudilio Markham  2. *No Contact Specified*      Spouse    [phone]      GUARANTOR                  Guarantor: RAJI SINGER       : 1985   Address: [address]    [place]: [place] [address] Guarantor  Type: B/H   Relation to Patient: Self         Home Phone: [phone]   Guarantor ID: [id number]         Work Phone:     GUARANTOR EMPLOYER     Employer: [employer]           Status: FULL TIME      COVERAGE          PRIMARY INSURANCE   Payor / Plan: BLUE CROSS BLUE SHIELD/[facility] PP       Group Number: [id number]       Subscriber Name: RAJI SINGER Subscriber : 1985   Subscriber ID: [id number] Pat. Rel. to Subscriber: Self   Insurance Address: [facility] [address]  [place] [address]       SECONDARY INSURANCE   Payor / Plan: - No Secondary Coverage -       Group Number:         Subscriber Name:   Subscriber :     Subscriber ID:   Pat. Rel. to Subscriber:     Insurance Address:            Contact Serial # ([id number])       September 15, 2022    Chart ID ([id number])

## 2022-09-15 NOTE — PSYCH
Patient will be following up with [facility] at [address] [place] [address] in [place], [phone].  Appointment will be on 9/28/22 at 8 am.  Patient will receive tobacco cessation therapy and addictions counseling.  Patient had a negative UDS on admit.  AVS faxed to [phone] on 9/15/22 at 2:47 pm.

## 2022-09-15 NOTE — PLAN OF CARE
"Behavioral Health Unit  Psychosocial History and Assessment  Progress Note      Patient Name: Hal Calderon YOB: 1985 SW: AMARJIT ARROYO, LPC Date: 9/15/2022    Chief Complaint: delusions    Consent:     Did the patient consent for an interview with the ? Yes    Did the patient consent for the  to contact family/friend/caregiver?   Yes  Deondre Calderon /father  [phone]    Did the patient give consent for the  to inform family/friend/caregiver of his/her whereabouts or to discuss discharge planning? Yes    Source of Information: Face to face with patient, Telephone interview with family/friend/caregiver, Chart review, and Treatment team meeting/rounds    Is information obtained from interviews considered reliable?   yes    Reason for Admission:     Active Hospital Problems    Diagnosis  POA    Psychosis [F29]  Yes      Resolved Hospital Problems   No resolved problems to display.       History of Present Illness - (Patient Perception):   "My wife is saying I'm crazy so I can stay locked up in here so she can have sex with her boyfriend... this is ridiculous, I shouldn't be here... she's trying to frame me. " He states his evidence for this is that he "Dropped in" via [employer] while he was at work and heard her having sex with her "boyfriend."     History of Present Illness - (Perception of Others):      Present biopsychosocial functioning:   Pt is a 37 year old male with chief complaints of delusions. patient reports he is convinced his wife is trying to "put me away "because she is cheating on him. "She is trying to frame me and say I am crazy when I am actually not." Patient reports he has never seen this person but knows she is cheating. He reports he has suspected wife of cheating for one year. Reports he did not expect her to file an OPC.    Past biopsychosocial functioning:   Pt has no past history of psychiatric hospitalizations.     Family and " Marital/Relationship History:     Significant Other/Partner Relationships:  : Relationship strained    Family Relationships: Strained      Childhood History:     Where was patient raised? [place]    Who raised the patient? Biological parents      How does patient describe their childhood?   Pt states good      Who is patient's primary support person?   Deondre Flores      Culture and Mosque:     Mosque: Episcopalian    How strong of a role does Temple and spirituality play in patient's life? Spiritual without any formal affiliations.     Jewish or spiritual concerns regarding treatment: not applicable     History of Abuse:   History of Abuse: Denies      Outcome: N/A    Psychiatric and Medical History:     History of psychiatric illness or treatment: none    Medical history:   Past Medical History:   Diagnosis Date    ADD (attention deficit disorder)     Special screening for malignant neoplasm of colon 09/06/2016       Substance Abuse History:     Alcohol - (Patient Perspective):   Social History     Substance and Sexual Activity   Alcohol Use No       Alcohol - (Collateral Perspective): Per chart reviw pt sumner s not endorse in alcohol.    Drugs - (Patient Perspective):   Social History     Substance and Sexual Activity   Drug Use No       Drugs - (Collateral Perspective): Per chart review pt does not endorse in drug abuse.     Additional Comments: Pt states he did endorse in marijuana in the past daily, but does not endorse in it anymore due to his job.     Education:     Currently Enrolled? No  Pt states he quit in the 6th grade to go to work.    Special Education? No    Interested in Completing Education/GED: No    Employment and Financial:     Currently employed? Employed: Current Occupation: [employer]. 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 [employer] and has been doing this for 20 years.    Source of Income: salary    Able to afford basic needs (food, shelter, utilities)? Yes    Who manages  finances/personal affairs?   Pt states self.       Service:     ? no    Combat Service? No     Community Resources:     Describe present use of community resources: [facility]     Identify previously used community resources   (Include previous mental health treatment - outpatient and inpatient):   none    Environmental:     Current living situation:Lives with spouse    Social Evaluation:     Patient Assets: Average or above intelligence, Work skills, Communicable skills, and Financial means    Patient Limitations: none    High risk psychosocial issues that may impact discharge planning:   delusions    Recommendations:     Anticipated discharge plan:   outpatient follow up: [facility]    High risk issues requiring early treatment planning and immediate intervention:   Delusions.    Community resources needed for discharge planning:  aftercare treatment sources    Anticipated social work role(s) in treatment and discharge planning:   [facility] will encourage pt to attend all groups and to assist pt with aftercare planning. [facility] will continue to assess pt needs throughout stay.